# Patient Record
Sex: FEMALE | Race: WHITE | Employment: FULL TIME | URBAN - METROPOLITAN AREA
[De-identification: names, ages, dates, MRNs, and addresses within clinical notes are randomized per-mention and may not be internally consistent; named-entity substitution may affect disease eponyms.]

---

## 2024-08-06 ENCOUNTER — APPOINTMENT (OUTPATIENT)
Dept: PODIATRY | Facility: CLINIC | Age: 59
End: 2024-08-06
Payer: COMMERCIAL

## 2024-08-06 DIAGNOSIS — Q82.8 PALMAR, PLANTAR, AND DISSEMINATED POROKERATOSIS: Primary | ICD-10-CM

## 2024-08-06 DIAGNOSIS — M79.674 PAIN OF TOE OF RIGHT FOOT: ICD-10-CM

## 2024-08-06 DIAGNOSIS — M20.41 HAMMER TOE OF RIGHT FOOT: ICD-10-CM

## 2024-08-06 DIAGNOSIS — L60.0 INGROWN TOENAIL: ICD-10-CM

## 2024-08-06 PROCEDURE — 17110 DESTRUCTION B9 LES UP TO 14: CPT | Performed by: PODIATRIST

## 2024-08-06 PROCEDURE — 99213 OFFICE O/P EST LOW 20 MIN: CPT | Performed by: PODIATRIST

## 2024-08-06 RX ORDER — ESTRADIOL 0.1 MG/G
1 CREAM VAGINAL WEEKLY
COMMUNITY
Start: 2024-07-08

## 2024-08-06 RX ORDER — TOPIRAMATE 100 MG/1
100 TABLET, FILM COATED ORAL DAILY
COMMUNITY
Start: 2024-04-15

## 2024-08-06 RX ORDER — METHYLPREDNISOLONE 4 MG/1
TABLET ORAL
COMMUNITY
Start: 2024-05-23

## 2024-08-06 RX ORDER — CARISOPRODOL 350 MG/1
TABLET ORAL
COMMUNITY

## 2024-08-06 RX ORDER — QUETIAPINE FUMARATE 50 MG/1
1 TABLET, FILM COATED ORAL NIGHTLY
COMMUNITY
Start: 2024-06-13

## 2024-08-06 RX ORDER — VITAMIN B COMPLEX
TABLET ORAL 2 TIMES DAILY
COMMUNITY

## 2024-08-06 RX ORDER — DULOXETIN HYDROCHLORIDE 60 MG/1
CAPSULE, DELAYED RELEASE ORAL
COMMUNITY
Start: 2024-04-04

## 2024-08-06 RX ORDER — NARATRIPTAN 2.5 MG/1
TABLET ORAL
COMMUNITY
Start: 2024-02-06

## 2024-08-06 RX ORDER — ATOGEPANT 60 MG/1
60 TABLET ORAL DAILY
COMMUNITY

## 2024-08-06 RX ORDER — DENOSUMAB 60 MG/ML
60 INJECTION SUBCUTANEOUS
COMMUNITY
Start: 2022-12-07

## 2024-08-06 RX ORDER — UBROGEPANT 100 MG/1
TABLET ORAL
COMMUNITY
Start: 2024-01-25

## 2024-08-06 RX ORDER — METAXALONE 800 MG/1
400 TABLET ORAL 3 TIMES DAILY
COMMUNITY
Start: 2024-05-03

## 2024-08-06 RX ORDER — PROPRANOLOL HYDROCHLORIDE 20 MG/1
TABLET ORAL
COMMUNITY
Start: 2023-06-26

## 2024-08-06 RX ORDER — SCOLOPAMINE TRANSDERMAL SYSTEM 1 MG/1
1 PATCH, EXTENDED RELEASE TRANSDERMAL
COMMUNITY
Start: 2020-02-13

## 2024-08-06 RX ORDER — EXEMESTANE 25 MG/1
TABLET ORAL
COMMUNITY

## 2024-08-06 NOTE — PROGRESS NOTES
History Of Present Illness  Maribel Bender is a 59 y.o. female presenting with chief complaint of: patient complains of ingrown right great toenail. Patient complains of redness bottom of right 4th toe.  Patient is an avid walker.    PCP Jerry Vargas MD  Last visit 4/17/274       Past Medical History  She has no past medical history on file.    Surgical History  She has a past surgical history that includes Other surgical history (01/07/2020).     Social History  She has no history on file for tobacco use, alcohol use, and drug use.    Family History  No family history on file.     Allergies  Morphine and Sumatriptan    Medications  Current Outpatient Medications   Medication Sig Dispense Refill    calcium carb/D3/magnesium/zinc (calcium carb-D3-mag cmb11-zinc) [id number] mg-unit-mg-mg tablet Take by mouth twice a day.      carisoprodol (Soma) 350 mg tablet       denosumab (Prolia) 60 mg/mL syringe Inject 1 mL (60 mg) under the skin every 6 months.      DULoxetine (Cymbalta) 60 mg DR capsule       estradiol (Estrace) 0.01 % (0.1 mg/gram) vaginal cream Insert 0.25 Applicatorfuls (1 g) into the vagina once a week.      exemestane (Aromasin) 25 mg tablet       metaxalone (Skelaxin) 800 mg tablet Take 0.5 tablets (400 mg) by mouth 3 times a day.      methylPREDNISolone (Medrol Dospak) 4 mg tablets TAKE 6 TABLETS ON DAY 1 AS DIRECTED ON PACKAGE AND DECREASE BY 1 TAB EACH DAY FOR A TOTAL OF 6 DAYS      naratriptan (Amerge) 2.5 mg tablet Take 1 tablet twice a day for 2 days then 1 tablet daily for 3 days.      propranolol (Inderal) 20 mg tablet       QUEtiapine (SEROquel) 50 mg tablet Take 1 tablet (50 mg) by mouth once daily at bedtime.      Qulipta 60 mg tablet tablet Take 1 tablet (60 mg) by mouth once daily.      scopolamine (Transderm-Scop) 1 mg over 3 days patch 3 day Place 1 patch on the skin every 3rd day.      topiramate (Topamax) 100 mg tablet Take 1 tablet (100 mg) by mouth once daily.      Ubrelvy 100 mg  tablet tablet 1 TABLET ORALLY AT ONSET OF MIGRAINE. MAY REPEAT 2-4 HOURS LATER. MAX 2 TABS IN 24 HOURS AND/OR 2 DAYS A WEEK       No current facility-administered medications for this visit.       Review of Systems    REVIEW OF SYSTEMS  GENERAL:  Negative for malaise, significant weight loss, fever  CARDIOVASCULAR: leg swelling   MUSCULOSKELETAL:  Negative for joint pain or swelling, back pain, and muscle pain.  SKIN:  Negative for lesions, rash, and itching  PSYCH:  Negative for sleep disturbance, mood disorder and recent psychosocial stressors  NEURO: Negative, denies any burning, tingling or numbness     Objective:   Vasc: DP and PT pulses are palpable bilateral.  CFT is less than 3 seconds bilateral.  Skin temperature is warm to cool proximal to distal bilateral.      Neuro:  Light touch is intact to the foot bilateral.  Protective sensation is intact to the foot when tested with the 5.07 SWM bilateral.  There is no clonus noted.  The hallux is downgoing bilateral.      Derm: Subfourth toe right foot: Patient has lesion (s) on plantar feet that are punctate with nucleated deep painful core  Right first toenail does not appear incurvated.  On the tibial nail fold is tender but patient has also had previous toenail avulsion and there does not look like there is any regrowth.  Patient does have increased callus formation on her medial hallux.    Ortho: Muscle strength is 5/5 for all pedal groups tested.  Ankle joint, subtalar joint, 1st MPJ and lesser MPJ ROM is full and without pain or crepitus.  The foot type is rectus bilateral off weight bearing.  Flexible hammertoe deformity of the fourth right toe     Assessment/Plan     Diagnoses and all orders for this visit:  Palmar, plantar, and disseminated porokeratosis  Pain of toe of right foot  Ingrown toenail  Hammer toe of right foot      Lesion(s) are excised with scalpel blade.  Cryo treatment is performed for 30 seconds x 3  Explained to patient that she could  have a hammertoe repair of the second toe   Mild debridement was done of toenail.  Ultimately feel that patient's pain is from shoe gear pressure.  Patient can try wearing toe pads to cushion her feet while she is exercising.        Sofya Castaneda, CMA